# Patient Record
Sex: MALE | Race: WHITE | NOT HISPANIC OR LATINO | URBAN - METROPOLITAN AREA
[De-identification: names, ages, dates, MRNs, and addresses within clinical notes are randomized per-mention and may not be internally consistent; named-entity substitution may affect disease eponyms.]

---

## 2017-01-03 ENCOUNTER — OFFICE VISIT - RIVER FALLS (OUTPATIENT)
Dept: FAMILY MEDICINE | Facility: CLINIC | Age: 67
End: 2017-01-03

## 2017-02-07 ENCOUNTER — OFFICE VISIT - RIVER FALLS (OUTPATIENT)
Dept: FAMILY MEDICINE | Facility: CLINIC | Age: 67
End: 2017-02-07

## 2017-03-07 ENCOUNTER — OFFICE VISIT - RIVER FALLS (OUTPATIENT)
Dept: FAMILY MEDICINE | Facility: CLINIC | Age: 67
End: 2017-03-07

## 2017-04-04 ENCOUNTER — OFFICE VISIT - RIVER FALLS (OUTPATIENT)
Dept: FAMILY MEDICINE | Facility: CLINIC | Age: 67
End: 2017-04-04

## 2017-05-09 ENCOUNTER — OFFICE VISIT - RIVER FALLS (OUTPATIENT)
Dept: FAMILY MEDICINE | Facility: CLINIC | Age: 67
End: 2017-05-09

## 2017-06-13 ENCOUNTER — AMBULATORY - RIVER FALLS (OUTPATIENT)
Dept: FAMILY MEDICINE | Facility: CLINIC | Age: 67
End: 2017-06-13

## 2017-07-18 ENCOUNTER — AMBULATORY - RIVER FALLS (OUTPATIENT)
Dept: FAMILY MEDICINE | Facility: CLINIC | Age: 67
End: 2017-07-18

## 2017-08-29 ENCOUNTER — AMBULATORY - RIVER FALLS (OUTPATIENT)
Dept: FAMILY MEDICINE | Facility: CLINIC | Age: 67
End: 2017-08-29

## 2017-09-29 ENCOUNTER — AMBULATORY - RIVER FALLS (OUTPATIENT)
Dept: FAMILY MEDICINE | Facility: CLINIC | Age: 67
End: 2017-09-29

## 2017-11-02 ENCOUNTER — AMBULATORY - RIVER FALLS (OUTPATIENT)
Dept: FAMILY MEDICINE | Facility: CLINIC | Age: 67
End: 2017-11-02

## 2017-12-12 ENCOUNTER — AMBULATORY - RIVER FALLS (OUTPATIENT)
Dept: FAMILY MEDICINE | Facility: CLINIC | Age: 67
End: 2017-12-12

## 2018-01-23 ENCOUNTER — AMBULATORY - RIVER FALLS (OUTPATIENT)
Dept: FAMILY MEDICINE | Facility: CLINIC | Age: 68
End: 2018-01-23

## 2018-02-20 ENCOUNTER — AMBULATORY - RIVER FALLS (OUTPATIENT)
Dept: FAMILY MEDICINE | Facility: CLINIC | Age: 68
End: 2018-02-20

## 2018-03-06 ENCOUNTER — OFFICE VISIT - RIVER FALLS (OUTPATIENT)
Dept: FAMILY MEDICINE | Facility: CLINIC | Age: 68
End: 2018-03-06

## 2018-04-10 ENCOUNTER — AMBULATORY - RIVER FALLS (OUTPATIENT)
Dept: FAMILY MEDICINE | Facility: CLINIC | Age: 68
End: 2018-04-10

## 2018-05-08 ENCOUNTER — AMBULATORY - RIVER FALLS (OUTPATIENT)
Dept: FAMILY MEDICINE | Facility: CLINIC | Age: 68
End: 2018-05-08

## 2018-06-12 ENCOUNTER — AMBULATORY - RIVER FALLS (OUTPATIENT)
Dept: FAMILY MEDICINE | Facility: CLINIC | Age: 68
End: 2018-06-12

## 2018-07-17 ENCOUNTER — AMBULATORY - RIVER FALLS (OUTPATIENT)
Dept: FAMILY MEDICINE | Facility: CLINIC | Age: 68
End: 2018-07-17

## 2018-08-21 ENCOUNTER — AMBULATORY - RIVER FALLS (OUTPATIENT)
Dept: FAMILY MEDICINE | Facility: CLINIC | Age: 68
End: 2018-08-21

## 2018-09-25 ENCOUNTER — AMBULATORY - RIVER FALLS (OUTPATIENT)
Dept: FAMILY MEDICINE | Facility: CLINIC | Age: 68
End: 2018-09-25

## 2018-11-06 ENCOUNTER — AMBULATORY - RIVER FALLS (OUTPATIENT)
Dept: FAMILY MEDICINE | Facility: CLINIC | Age: 68
End: 2018-11-06

## 2018-12-11 ENCOUNTER — AMBULATORY - RIVER FALLS (OUTPATIENT)
Dept: FAMILY MEDICINE | Facility: CLINIC | Age: 68
End: 2018-12-11

## 2019-01-15 ENCOUNTER — AMBULATORY - RIVER FALLS (OUTPATIENT)
Dept: FAMILY MEDICINE | Facility: CLINIC | Age: 69
End: 2019-01-15

## 2019-01-15 LAB — INR BLD: 1.8

## 2019-02-12 ENCOUNTER — AMBULATORY - RIVER FALLS (OUTPATIENT)
Dept: FAMILY MEDICINE | Facility: CLINIC | Age: 69
End: 2019-02-12

## 2019-02-12 LAB — INR BLD: 1.5

## 2019-02-27 ENCOUNTER — AMBULATORY - RIVER FALLS (OUTPATIENT)
Dept: FAMILY MEDICINE | Facility: CLINIC | Age: 69
End: 2019-02-27

## 2019-02-27 LAB — INR BLD: 3.2

## 2019-03-27 ENCOUNTER — AMBULATORY - RIVER FALLS (OUTPATIENT)
Dept: FAMILY MEDICINE | Facility: CLINIC | Age: 69
End: 2019-03-27

## 2019-03-27 LAB — INR BLD: 1.6

## 2019-05-01 ENCOUNTER — AMBULATORY - RIVER FALLS (OUTPATIENT)
Dept: FAMILY MEDICINE | Facility: CLINIC | Age: 69
End: 2019-05-01

## 2019-05-15 ENCOUNTER — AMBULATORY - RIVER FALLS (OUTPATIENT)
Dept: FAMILY MEDICINE | Facility: CLINIC | Age: 69
End: 2019-05-15

## 2019-05-15 LAB — INR BLD: 2.6

## 2019-05-31 LAB — INR BLD: 1.6

## 2019-06-19 ENCOUNTER — AMBULATORY - RIVER FALLS (OUTPATIENT)
Dept: FAMILY MEDICINE | Facility: CLINIC | Age: 69
End: 2019-06-19

## 2019-06-19 LAB — INR BLD: 2

## 2019-07-16 ENCOUNTER — AMBULATORY - RIVER FALLS (OUTPATIENT)
Dept: FAMILY MEDICINE | Facility: CLINIC | Age: 69
End: 2019-07-16

## 2019-07-16 LAB — INR BLD: 2.2

## 2019-08-20 ENCOUNTER — AMBULATORY - RIVER FALLS (OUTPATIENT)
Dept: FAMILY MEDICINE | Facility: CLINIC | Age: 69
End: 2019-08-20

## 2019-09-05 ENCOUNTER — AMBULATORY - RIVER FALLS (OUTPATIENT)
Dept: FAMILY MEDICINE | Facility: CLINIC | Age: 69
End: 2019-09-05

## 2019-09-05 LAB — INR BLD: 3.6

## 2019-09-18 ENCOUNTER — AMBULATORY - RIVER FALLS (OUTPATIENT)
Dept: FAMILY MEDICINE | Facility: CLINIC | Age: 69
End: 2019-09-18

## 2019-09-18 LAB — INR BLD: 1.9

## 2019-10-16 ENCOUNTER — AMBULATORY - RIVER FALLS (OUTPATIENT)
Dept: FAMILY MEDICINE | Facility: CLINIC | Age: 69
End: 2019-10-16

## 2019-10-16 LAB — INR BLD: 2

## 2019-12-18 ENCOUNTER — AMBULATORY - RIVER FALLS (OUTPATIENT)
Dept: FAMILY MEDICINE | Facility: CLINIC | Age: 69
End: 2019-12-18

## 2020-01-02 ENCOUNTER — AMBULATORY - RIVER FALLS (OUTPATIENT)
Dept: FAMILY MEDICINE | Facility: CLINIC | Age: 70
End: 2020-01-02

## 2020-01-02 LAB — INR BLD: 2.5

## 2020-02-06 ENCOUNTER — AMBULATORY - RIVER FALLS (OUTPATIENT)
Dept: FAMILY MEDICINE | Facility: CLINIC | Age: 70
End: 2020-02-06

## 2020-02-06 LAB — INR BLD: 2.8

## 2020-03-05 ENCOUNTER — AMBULATORY - RIVER FALLS (OUTPATIENT)
Dept: FAMILY MEDICINE | Facility: CLINIC | Age: 70
End: 2020-03-05

## 2020-03-05 LAB — INR BLD: 2.7

## 2020-04-02 ENCOUNTER — AMBULATORY - RIVER FALLS (OUTPATIENT)
Dept: FAMILY MEDICINE | Facility: CLINIC | Age: 70
End: 2020-04-02

## 2020-04-02 LAB — INR BLD: 3.7

## 2022-02-15 NOTE — NURSING NOTE
PT/INR POC Entered On:  3/27/2019 1:07 PM CDT    Performed On:  3/27/2019 1:06 PM CDT by Mercedes Garner MA               PT/INR POC   INR POC :   1.6    Reference Range - INR POC :   2.0-3.0   POC Test Comments :   [facility] [phone]   Mercedes Garner MA - 3/27/2019 1:06 PM CDT   Details   Collection Date :   3/27/2019 1:00 PM CDT   Handling Specimen POC :   Capillary    POC Test Comments :   Lab Test Preformed by:   [facility] Office  [address] [place] [address]  Phone: [phone]  Fax: [phone]     Mercedes Garner MA - 3/27/2019 1:06 PM CDT

## 2022-02-15 NOTE — NURSING NOTE
PT/INR POC Entered On:  2/6/2020 1:02 PM CST    Performed On:  2/6/2020 1:00 PM CST by Schoenike , Andrea               PT/INR POC   INR POC :   2.8    Reference Range - INR POC :   2.0-3.0   Schoenike , Andrea - 2/6/2020 1:00 PM CST   Details   Collection Date :   2/6/2020 1:00 PM CST   Handling Specimen POC :   Capillary   POC Test Comments :   [facility] Bec[facility]: [phone]    Lab Test Preformed by:   [facility] Office  [address] [place] [address]  Phone: [phone]  Fax: [phone]     Schoenike , Andrea - 2/6/2020 1:00 PM CST

## 2022-02-15 NOTE — NURSING NOTE
PT/INR POC Entered On:  5/15/2019 1:06 PM CDT    Performed On:  5/15/2019 1:01 PM CDT by Mercedes Garner MA               PT/INR POC   INR POC :   2.6    Reference Range - INR POC :   2.0-3.0   Mercedes Garner MA - 5/15/2019 1:01 PM CDT   Details   Collection Date :   5/15/2019 1:05 PM CDT   Handling Specimen POC :   Capillary   POC Test Comments :   [phone] [facility] Breanna Rizo    Lab Test Preformed by:   [facility] Office  [address] [place] [address]  Phone: [phone]  Fax: [phone]     Mercedes Garner MA - 5/15/2019 1:01 PM CDT

## 2022-02-15 NOTE — NURSING NOTE
PT/INR POC Entered On:  9/18/2019 9:17 AM CDT    Performed On:  9/18/2019 9:16 AM CDT by Schoenike , Andrea               PT/INR POC   INR POC :   1.9    Reference Range - INR POC :   2.0-3.0   Schoenike , Andrea - 9/18/2019 9:16 AM CDT   Details   Collection Date :   9/18/2019 9:10 AM CDT   Handling Specimen POC :   Capillary   POC Test Comments :   [facility]: [phone]  ATTN: Breanna Rizo    Lab Test Preformed by:   [facility] Office  [address] [place] [address]  Phone: [phone]  Fax: [phone]     Schoenike , Andrea - 9/18/2019 9:16 AM CDT

## 2022-02-15 NOTE — NURSING NOTE
PT/INR POC Entered On:  1/2/2020 1:14 PM CST    Performed On:  1/2/2020 1:13 PM CST by Schoenike , Andrea               PT/INR POC   INR POC :   2.5    Reference Range - INR POC :   2.0-3.0   Schoenike , Andrea - 1/2/2020 1:13 PM CST   Details   Collection Date :   1/2/2020 1:05 PM CST   Handling Specimen POC :   Capillary   POC Test Comments :   [facility] Becerra  [phone]    Lab Test Preformed by:   [facility] Office  [address] [place] [address]  Phone: [phone]  Fax: [phone]     Schoenike , Andrea - 1/2/2020 1:13 PM CST

## 2022-02-15 NOTE — TELEPHONE ENCOUNTER
---------------------  From: Dasha Ying RN   To: Phone Messages Pool ([id number] - [place]);     Sent: 9/17/2019 10:27:41 AM CDT  Subject: INR order     INR standing order expires 10/2019 [facility] and requested a new order to be faxed to us for ongoing INR testing.

## 2022-02-15 NOTE — NURSING NOTE
PT/INR POC Entered On:  9/5/2019 12:57 PM CDT    Performed On:  9/5/2019 12:57 PM CDT by Mercedes Garner MA               PT/INR POC   INR POC :   3.6    Reference Range - INR POC :   2.0-3.0   Mercedes Garner MA - 9/5/2019 12:57 PM CDT   Details   Collection Date :   9/5/2019 12:55 PM CDT   Handling Specimen POC :   Capillary   Mercedes Garner MA - 9/5/2019 12:57 PM CDT   POC Test Comments :   [facility] Attn: Breanna Rizo [phone]    Lab Test Preformed by:  [facility] Office  [address] [place] [address]  Phone: [phone]  Fax: [phone]     Mercedes Garner MA - 9/5/2019 12:58 PM CDT

## 2022-02-15 NOTE — NURSING NOTE
PT/INR POC Entered On:  5/1/2019 1:07 PM CDT    Performed On:  5/1/2019 1:06 PM CDT by Mercedes Garner MA               PT/INR POC   INR POC :   1.6      Mercedes Garner MA - 5/31/2019 2:32 PM CDT     Reference Range - INR POC :   2.0-3.0   Mercedes Garner MA - 5/1/2019 1:06 PM CDT   Details   Collection Date :   5/1/2019 1:06 PM CDT   Handling Specimen POC :   Capillary   POC Test Comments :   Fax to [phone] Attn: Breanna Rizo Per Dr. Luis    Lab Test Preformed by:   [facility] Office  [address] [place] [address]  Phone: [phone]  Fax: [phone]     Mercedes Garner MA - 5/1/2019 1:06 PM CDT

## 2022-02-15 NOTE — NURSING NOTE
PT/INR POC Entered On:  3/5/2020 12:58 PM CST    Performed On:  3/5/2020 12:57 PM CST by Schoenike , Andrea               PT/INR POC   INR POC :   2.7    Reference Range - INR POC :   2.0-3.0   Schoenike , Andrea - 3/5/2020 12:57 PM CST   Details   Collection Date :   3/5/2020 12:55 PM CST   Handling Specimen POC :   Capillary   POC Test Comments :   [facility] Esha Becerra. [phone]    Lab Test Preformed by:   [facility] Office  [address] [place] [address]  Phone: [phone]  Fax: [phone]     Schoenike , Andrea - 3/5/2020 12:57 PM CST

## 2022-02-15 NOTE — NURSING NOTE
PT/INR POC Entered On:  2/12/2019 1:05 PM CST    Performed On:  2/12/2019 1:04 PM CST by Schoenike , Andrea               PT/INR POC   INR POC :   1.5    Reference Range - INR POC :   2.0-3.0   Schoenike , Andrea - 2/12/2019 1:04 PM CST   Details   Collection Date :   2/12/2019 1:00 PM CST   Handling Specimen POC :   Capillary   POC Test Comments :   [facility]: [phone]  ATTN: Breanna Rizo    Lab Test Preformed by:   [facility] Office  [address] [place] [address]  Phone: [phone]  Fax: [phone]     Schoenike , Andrea - 2/12/2019 1:04 PM CST

## 2022-02-15 NOTE — NURSING NOTE
PT/INR POC Entered On:  7/16/2019 1:51 PM CDT    Performed On:  7/16/2019 1:50 PM CDT by Schoenike , Andrea               PT/INR POC   INR POC :   2.2    Reference Range - INR POC :   2.0-3.0   Schoenike , Andrea - 7/16/2019 1:50 PM CDT   Details   Collection Date :   7/16/2019 1:05 PM CDT   Handling Specimen POC :   Capillary   POC Test Comments :   [facility] @ [phone]  ATTN: Breanna Rizo    Lab Test Preformed by:   [facility] [place] Office  [address] [place] [address]  Phone: [phone]  Fax: [phone]     Schoenike , Andrea - 7/16/2019 1:50 PM CDT

## 2022-02-15 NOTE — NURSING NOTE
PT/INR POC Entered On:  8/20/2019 1:17 PM CDT    Performed On:  8/20/2019 1:14 PM CDT by Schoenike , Andrea               PT/INR POC   Reference Range - INR POC :   2.0-3.0   POC Test Comments :   Above reportable parameters. S/O to confirm     Schoenike , Andrea - 8/20/2019 1:14 PM CDT   Details   Collection Date :   8/20/2019 1:05 PM CDT   Handling Specimen POC :   Capillary   POC Test Comments :   Fax to Dr. Luis @ [phone]  Should get fax from [facility] for final INR result    Lab Test Preformed by:   [facility] Office  [address] [place] [address]  Phone: [phone]  Fax: [phone]     Schoenike , Andrea - 8/20/2019 1:14 PM CDT

## 2022-02-15 NOTE — NURSING NOTE
PT/INR POC Entered On:  2/27/2019 11:24 AM CST    Performed On:  2/27/2019 11:23 AM CST by Schoenike , Andrea               PT/INR POC   INR POC :   3.2    Reference Range - INR POC :   2.0-3.0   Schoenike , Andrea - 2/27/2019 11:23 AM CST   Details   Collection Date :   2/27/2019 11:23 AM CST   Handling Specimen POC :   Capillary   POC Test Comments :   [facility]: [phone]    Lab Test Preformed by:   [facility] Office  [address] [place] [address]  Phone: [phone]  Fax: [phone]     Schoenike , Andrea - 2/27/2019 11:23 AM CST

## 2022-02-15 NOTE — NURSING NOTE
PT/INR POC Entered On:  4/2/2020 1:10 PM CDT    Performed On:  4/2/2020 1:08 PM CDT by Schoenike , Andrea               PT/INR POC   INR POC :   3.7    Reference Range - INR POC :   2.0-3.0   Schoenike , Andrea - 4/2/2020 1:08 PM CDT   Details   Collection Date :   4/2/2020 12:55 PM CDT   Handling Specimen POC :   Capillary   POC Test Comments :   [facility] Esha Becerra @ [phone]    Lab Test Preformed by:   [facility] Office  [address] [place] [address]  Phone: [phone]  Fax: [phone]     Schoenike , Andrea - 4/2/2020 1:08 PM CDT

## 2022-02-15 NOTE — NURSING NOTE
PT/INR POC Entered On:  1/15/2019 1:01 PM CST    Performed On:  1/15/2019 1:00 PM CST by Schoenike , Andrea               PT/INR POC   INR POC :   1.8    Reference Range - INR POC :   2.0-3.0   Schoenike , Andrea - 1/15/2019 1:00 PM CST   Details   Collection Date :   1/15/2019 12:55 PM CST   Handling Specimen POC :   Capillary   POC Test Comments :   [facility]  Fax to: [phone]  ATTN: Breanna Rizo    Lab Test Preformed by:   [facility] Office  [address] [place] [address]  Phone: [phone]  Fax: [phone]     Schoenike , Andrea - 1/15/2019 1:00 PM CST

## 2022-02-15 NOTE — NURSING NOTE
Phone Message    PCP:   ??      Time of Call:  8:10 am       Person Calling:  Breanna - [facility]  Phone number:  [phone]  Note:   Breanna calls stating pt gets INR's done here at the clinic. Result may have been faxed yesterday, but she states their fax nachine is down right now. Looking for result from yesterday. This was given.

## 2022-02-15 NOTE — NURSING NOTE
PT/INR POC Entered On:  6/19/2019 1:02 PM CDT    Performed On:  6/19/2019 1:02 PM CDT by Schoenike , Andrea               PT/INR POC   INR POC :   2.0    Reference Range - INR POC :   2.0-3.0   Schoenike , Andrea - 6/19/2019 1:02 PM CDT   Details   Collection Date :   6/19/2019 1:00 PM CDT   Handling Specimen POC :   Capillary   Schoenike , Andrea - 6/19/2019 1:02 PM CDT   POC Test Comments :   [facility]. [phone]  Attn. Breanna Rizo    Lab Test Preformed by:   [facility] [place] [facility] Office  [address] [place] [address]  Phone: [phone]  Fax: [phone]     Schoenike , Andrea - 6/19/2019 1:04 PM CDT

## 2022-02-15 NOTE — NURSING NOTE
PT/INR POC Entered On:  10/16/2019 1:26 PM CDT    Performed On:  10/16/2019 1:25 PM CDT by Mercedes Garner MA               PT/INR POC   INR POC :   2.0    Reference Range - INR POC :   2.0-3.0   Mercedes Garner MA - 10/16/2019 1:25 PM CDT   Details   Collection Date :   10/16/2019 1:25 PM CDT   Handling Specimen POC :   Capillary   POC Test Comments :   Lab Test Preformed by:   [facility]  Fax to Dr. Luis at [facility] Breanna Rizo [phone]    [place] [address]  Phone: [phone]  Fax: [phone]     Mercedes Garner MA - 10/16/2019 1:25 PM CDT

## 2022-02-15 NOTE — NURSING NOTE
[facility] calls to report high INR lab result. Came back at 7.7. Lab notified - [facility] and had to leave them a message. I advised them of the result and due to the elevated result we did have to send it out to our specialty lab for verification due to the late response. I did advised pt not to take his warfarin and he mentioned that he already took it this morning. I did advised not to take any more until he [facility] the [facility]. Pt verbalized understanding. I also faxed these results and marked them as urgent.